# Patient Record
Sex: FEMALE | Race: WHITE | Employment: UNEMPLOYED | RURAL
[De-identification: names, ages, dates, MRNs, and addresses within clinical notes are randomized per-mention and may not be internally consistent; named-entity substitution may affect disease eponyms.]

---

## 2021-05-10 ENCOUNTER — HOSPITAL ENCOUNTER (EMERGENCY)
Age: 36
Discharge: HOME | End: 2021-05-10
Attending: FAMILY MEDICINE
Payer: OTHER GOVERNMENT

## 2021-05-10 ENCOUNTER — APPOINTMENT (OUTPATIENT)
Dept: CT IMAGING | Age: 36
End: 2021-05-10
Attending: FAMILY MEDICINE
Payer: OTHER GOVERNMENT

## 2021-05-10 VITALS
DIASTOLIC BLOOD PRESSURE: 72 MMHG | SYSTOLIC BLOOD PRESSURE: 134 MMHG | HEIGHT: 66 IN | WEIGHT: 231 LBS | BODY MASS INDEX: 37.12 KG/M2 | TEMPERATURE: 99.2 F | HEART RATE: 117 BPM | RESPIRATION RATE: 18 BRPM | OXYGEN SATURATION: 100 %

## 2021-05-10 DIAGNOSIS — J18.9 COMMUNITY ACQUIRED PNEUMONIA, UNSPECIFIED LATERALITY: Primary | ICD-10-CM

## 2021-05-10 LAB
ALBUMIN SERPL-MCNC: 3.7 G/DL (ref 3.5–5)
ALBUMIN/GLOB SERPL: 1 {RATIO} (ref 1.1–2.2)
ALP SERPL-CCNC: 93 U/L (ref 45–117)
ALT SERPL-CCNC: 184 U/L (ref 12–78)
ANION GAP SERPL CALC-SCNC: 15 MMOL/L (ref 5–15)
APPEARANCE UR: CLEAR
AST SERPL-CCNC: 109 U/L (ref 15–37)
BASOPHILS # BLD: 0 K/UL (ref 0–0.1)
BASOPHILS NFR BLD: 0 % (ref 0–1)
BILIRUB SERPL-MCNC: 0.3 MG/DL (ref 0.2–1)
BILIRUB UR QL CFM: NEGATIVE
BUN SERPL-MCNC: 11 MG/DL (ref 6–20)
BUN/CREAT SERPL: 13 (ref 12–20)
CALCIUM SERPL-MCNC: 8.3 MG/DL (ref 8.5–10.1)
CHLORIDE SERPL-SCNC: 103 MMOL/L (ref 97–108)
CO2 SERPL-SCNC: 22 MMOL/L (ref 21–32)
COLOR UR: ABNORMAL
CREAT SERPL-MCNC: 0.88 MG/DL (ref 0.55–1.02)
DIFFERENTIAL METHOD BLD: ABNORMAL
EOSINOPHIL # BLD: 0 K/UL (ref 0–0.4)
EOSINOPHIL NFR BLD: 0 % (ref 0–7)
ERYTHROCYTE [DISTWIDTH] IN BLOOD BY AUTOMATED COUNT: 14.1 % (ref 11.5–14.5)
FLUAV RNA SPEC QL NAA+PROBE: NOT DETECTED
FLUBV RNA SPEC QL NAA+PROBE: NOT DETECTED
GLOBULIN SER CALC-MCNC: 3.6 G/DL (ref 2–4)
GLUCOSE SERPL-MCNC: 98 MG/DL (ref 65–100)
GLUCOSE UR STRIP.AUTO-MCNC: NEGATIVE MG/DL
HCG UR QL: NEGATIVE
HCT VFR BLD AUTO: 36.7 % (ref 35–47)
HGB BLD-MCNC: 12.4 G/DL (ref 11.5–16)
HGB UR QL STRIP: NEGATIVE
IMM GRANULOCYTES # BLD AUTO: 0 K/UL (ref 0–0.04)
IMM GRANULOCYTES NFR BLD AUTO: 0 % (ref 0–0.5)
KETONES UR QL STRIP.AUTO: >80 MG/DL
LACTATE SERPL-SCNC: 1.1 MMOL/L (ref 0.4–2)
LEUKOCYTE ESTERASE UR QL STRIP.AUTO: NEGATIVE
LYMPHOCYTES # BLD: 1.4 K/UL (ref 0.8–3.5)
LYMPHOCYTES NFR BLD: 30 % (ref 12–49)
MCH RBC QN AUTO: 27 PG (ref 26–34)
MCHC RBC AUTO-ENTMCNC: 33.8 G/DL (ref 30–36.5)
MCV RBC AUTO: 79.8 FL (ref 80–99)
MONOCYTES # BLD: 0.4 K/UL (ref 0–1)
MONOCYTES NFR BLD: 8 % (ref 5–13)
NEUTS SEG # BLD: 2.9 K/UL (ref 1.8–8)
NEUTS SEG NFR BLD: 62 % (ref 32–75)
NITRITE UR QL STRIP.AUTO: NEGATIVE
NRBC # BLD: 0 K/UL (ref 0–0.01)
NRBC BLD-RTO: 0 PER 100 WBC
PH UR STRIP: 6 [PH] (ref 5–8)
PLATELET # BLD AUTO: 112 K/UL (ref 150–400)
PMV BLD AUTO: 9.1 FL (ref 8.9–12.9)
POTASSIUM SERPL-SCNC: 3.5 MMOL/L (ref 3.5–5.1)
PROT SERPL-MCNC: 7.3 G/DL (ref 6.4–8.2)
PROT UR STRIP-MCNC: NEGATIVE MG/DL
RBC # BLD AUTO: 4.6 M/UL (ref 3.8–5.2)
SARS-COV-2, COV2: NOT DETECTED
SODIUM SERPL-SCNC: 140 MMOL/L (ref 136–145)
SP GR UR REFRACTOMETRY: 1.02 (ref 1–1.03)
TROPONIN I SERPL-MCNC: <0.05 NG/ML
UROBILINOGEN UR QL STRIP.AUTO: 0.2 EU/DL (ref 0.2–1)
WBC # BLD AUTO: 4.6 K/UL (ref 3.6–11)

## 2021-05-10 PROCEDURE — 94640 AIRWAY INHALATION TREATMENT: CPT

## 2021-05-10 PROCEDURE — 36415 COLL VENOUS BLD VENIPUNCTURE: CPT

## 2021-05-10 PROCEDURE — 96365 THER/PROPH/DIAG IV INF INIT: CPT

## 2021-05-10 PROCEDURE — 74011000250 HC RX REV CODE- 250: Performed by: FAMILY MEDICINE

## 2021-05-10 PROCEDURE — 93005 ELECTROCARDIOGRAM TRACING: CPT

## 2021-05-10 PROCEDURE — 94664 DEMO&/EVAL PT USE INHALER: CPT

## 2021-05-10 PROCEDURE — 96367 TX/PROPH/DG ADDL SEQ IV INF: CPT

## 2021-05-10 PROCEDURE — 77030027138 HC INCENT SPIROMETER -A

## 2021-05-10 PROCEDURE — 71275 CT ANGIOGRAPHY CHEST: CPT

## 2021-05-10 PROCEDURE — 81003 URINALYSIS AUTO W/O SCOPE: CPT

## 2021-05-10 PROCEDURE — 83605 ASSAY OF LACTIC ACID: CPT

## 2021-05-10 PROCEDURE — 85025 COMPLETE CBC W/AUTO DIFF WBC: CPT

## 2021-05-10 PROCEDURE — 81025 URINE PREGNANCY TEST: CPT

## 2021-05-10 PROCEDURE — 74011250636 HC RX REV CODE- 250/636: Performed by: FAMILY MEDICINE

## 2021-05-10 PROCEDURE — 80053 COMPREHEN METABOLIC PANEL: CPT

## 2021-05-10 PROCEDURE — 84484 ASSAY OF TROPONIN QUANT: CPT

## 2021-05-10 PROCEDURE — 74011000258 HC RX REV CODE- 258: Performed by: FAMILY MEDICINE

## 2021-05-10 PROCEDURE — 74011000636 HC RX REV CODE- 636: Performed by: FAMILY MEDICINE

## 2021-05-10 PROCEDURE — 99284 EMERGENCY DEPT VISIT MOD MDM: CPT

## 2021-05-10 PROCEDURE — 87636 SARSCOV2 & INF A&B AMP PRB: CPT

## 2021-05-10 PROCEDURE — 87040 BLOOD CULTURE FOR BACTERIA: CPT

## 2021-05-10 PROCEDURE — 77030029684 HC NEB SM VOL KT MONA -A

## 2021-05-10 RX ORDER — ALBUTEROL SULFATE 90 UG/1
2 AEROSOL, METERED RESPIRATORY (INHALATION)
Qty: 1 INHALER | Refills: 0 | Status: SHIPPED | OUTPATIENT
Start: 2021-05-10

## 2021-05-10 RX ORDER — SODIUM CHLORIDE 0.9 % (FLUSH) 0.9 %
5-10 SYRINGE (ML) INJECTION ONCE
Status: DISCONTINUED | OUTPATIENT
Start: 2021-05-10 | End: 2021-05-10 | Stop reason: HOSPADM

## 2021-05-10 RX ORDER — CEFDINIR 300 MG/1
300 CAPSULE ORAL 2 TIMES DAILY
Qty: 14 CAP | Refills: 0 | Status: SHIPPED | OUTPATIENT
Start: 2021-05-10

## 2021-05-10 RX ORDER — INHALER, ASSIST DEVICES
1 SPACER (EA) MISCELLANEOUS AS NEEDED
Qty: 1 DEVICE | Refills: 0 | Status: SHIPPED | OUTPATIENT
Start: 2021-05-10

## 2021-05-10 RX ORDER — IPRATROPIUM BROMIDE AND ALBUTEROL SULFATE 2.5; .5 MG/3ML; MG/3ML
3 SOLUTION RESPIRATORY (INHALATION)
Status: COMPLETED | OUTPATIENT
Start: 2021-05-10 | End: 2021-05-10

## 2021-05-10 RX ADMIN — CEFTRIAXONE SODIUM 2 G: 2 INJECTION, POWDER, FOR SOLUTION INTRAMUSCULAR; INTRAVENOUS at 03:33

## 2021-05-10 RX ADMIN — IPRATROPIUM BROMIDE AND ALBUTEROL SULFATE 3 ML: .5; 3 SOLUTION RESPIRATORY (INHALATION) at 04:10

## 2021-05-10 RX ADMIN — SODIUM CHLORIDE 1000 ML: 9 INJECTION, SOLUTION INTRAVENOUS at 03:33

## 2021-05-10 RX ADMIN — AZITHROMYCIN MONOHYDRATE 500 MG: 500 INJECTION, POWDER, LYOPHILIZED, FOR SOLUTION INTRAVENOUS at 04:15

## 2021-05-10 RX ADMIN — IOPAMIDOL 100 ML: 755 INJECTION, SOLUTION INTRAVENOUS at 02:59

## 2021-05-10 NOTE — DISCHARGE INSTRUCTIONS
Return if worse or for change in symptoms, difficulty breathing, blood in sputum, problems as noted above. Continue Levaquin. We added on Omnicef and albuterol metered-dose inhaler. You received IV Rocephin and Zithromax and a DuoNeb nebulizer treatment here in the ED. Metered-dose inhaler should be used with a spacer device and should be started later today.

## 2021-05-10 NOTE — ED TRIAGE NOTES
Patient diagnosed with pneumonia on Friday, patient states she is having shortness of breath. Patient had two covid test done this week both negative.

## 2021-05-10 NOTE — ED PROVIDER NOTES
Patient is a 26-year-old female with a history of anemia and anxiety who presents with shortness of breath. She states her symptoms started 5 days prior to arrival.  She has had fever with sweats at home, T-max to 102. She has had a nonproductive cough. She has pleuritic chest pain left greater than right and chest heaviness when she takes a deep breath. She was seen at an urgent care facility 2 days ago, has a chest x-ray which showed pneumonia in the right and left and was started on Levaquin. She has been taking the antibiotics. She notes that she seems to be getting worse with increasing shortness of breath with exertion and now with just doing her hair. She does not smoke. She has no history of asthma. No calf pain swelling or pain. She has missed a menstral period but she states that she had a negative pregnancy test 2 days ago. She also states that she had a negative Covid test 2 days ago at the urgent care facility. No other history of pneumonia. She does not work at a healthcare facility. Patient states that she had been complaining of sinus pain last week and was taking Zithromax up until approximately 3 days ago. Patient states she was offered steroids but declined this as they make her anxiety disorder worse.            Past Medical History:   Diagnosis Date    Anemia NEC     Anxiety 6/22/2011    Arthritis     Depression     Depression 6/22/2011    GERD (gastroesophageal reflux disease)     Headache(784.0)        Past Surgical History:   Procedure Laterality Date    HX ABDOMINAL LAPAROSCOPY           Family History:   Problem Relation Age of Onset    Depression Mother     Substance Abuse Mother     Depression Father     Substance Abuse Father     Cancer Father     Depression Sister     Migraines Sister     Cancer Maternal Grandfather     Diabetes Paternal Grandmother     Stroke Paternal Grandmother     Heart Disease Paternal Grandmother        Social History Socioeconomic History    Marital status:      Spouse name: Not on file    Number of children: Not on file    Years of education: Not on file    Highest education level: Not on file   Occupational History    Not on file   Social Needs    Financial resource strain: Not on file    Food insecurity     Worry: Not on file     Inability: Not on file    Transportation needs     Medical: Not on file     Non-medical: Not on file   Tobacco Use    Smoking status: Never Smoker    Smokeless tobacco: Never Used   Substance and Sexual Activity    Alcohol use: No    Drug use: No    Sexual activity: Yes     Partners: Male   Lifestyle    Physical activity     Days per week: Not on file     Minutes per session: Not on file    Stress: Not on file   Relationships    Social connections     Talks on phone: Not on file     Gets together: Not on file     Attends Sabianism service: Not on file     Active member of club or organization: Not on file     Attends meetings of clubs or organizations: Not on file     Relationship status: Not on file    Intimate partner violence     Fear of current or ex partner: Not on file     Emotionally abused: Not on file     Physically abused: Not on file     Forced sexual activity: Not on file   Other Topics Concern    Not on file   Social History Narrative    Not on file         ALLERGIES: Trazodone    Review of Systems   All other systems reviewed and are negative. Vitals:    05/10/21 0040 05/10/21 0340 05/10/21 0418 05/10/21 0527   BP: (!) 161/72 116/73  134/72   Pulse: (!) 104 91  (!) 117   Resp: 18 16  18   Temp: 99.2 °F (37.3 °C)      SpO2: 95% 98% 99% 100%   Weight: 104.8 kg (231 lb)      Height: 5' 6\" (1.676 m)               Physical Exam  Vitals signs and nursing note reviewed. Constitutional:       General: She is not in acute distress. Appearance: Normal appearance. She is obese. She is not ill-appearing or toxic-appearing.       Comments: Anxious appearing HENT:      Head: Normocephalic and atraumatic. Right Ear: External ear normal.      Left Ear: External ear normal.      Nose: Nose normal.      Mouth/Throat:      Mouth: Mucous membranes are moist.      Pharynx: No oropharyngeal exudate or posterior oropharyngeal erythema. Eyes:      Extraocular Movements: Extraocular movements intact. Conjunctiva/sclera: Conjunctivae normal.      Pupils: Pupils are equal, round, and reactive to light. Neck:      Musculoskeletal: Normal range of motion and neck supple. No muscular tenderness. Vascular: No JVD. Trachea: No tracheal deviation. Cardiovascular:      Rate and Rhythm: Regular rhythm. Tachycardia present. Heart sounds: Normal heart sounds. No murmur. No friction rub. No gallop. Pulmonary:      Effort: Pulmonary effort is normal. No tachypnea, accessory muscle usage or respiratory distress. Breath sounds: Normal breath sounds. No stridor. No decreased breath sounds, wheezing, rhonchi or rales. Comments: O2 sats 98% on room air  Chest:      Chest wall: No tenderness or edema. Abdominal:      General: There is no distension. Palpations: Abdomen is soft. Tenderness: There is no abdominal tenderness. There is no right CVA tenderness, left CVA tenderness, guarding or rebound. Musculoskeletal: Normal range of motion. General: No swelling, tenderness, deformity or signs of injury. Right lower leg: She exhibits no tenderness. No edema. Left lower leg: She exhibits no tenderness. No edema. Lymphadenopathy:      Cervical: No cervical adenopathy. Skin:     General: Skin is warm and dry. Capillary Refill: Capillary refill takes less than 2 seconds. Coloration: Skin is not jaundiced or pale. Findings: No rash. Neurological:      General: No focal deficit present. Mental Status: She is alert and oriented to person, place, and time. Cranial Nerves: No cranial nerve deficit. Sensory: No sensory deficit. Motor: No weakness. Coordination: Coordination normal.      Gait: Gait normal.   Psychiatric:         Mood and Affect: Mood normal.         Behavior: Behavior normal.         Thought Content:  Thought content normal.         Judgment: Judgment normal.        Labs -     Recent Results (from the past 12 hour(s))   EKG, 12 LEAD, INITIAL    Collection Time: 05/10/21  1:27 AM   Result Value Ref Range    Ventricular Rate 91 BPM    Atrial Rate 91 BPM    P-R Interval 132 ms    QRS Duration 66 ms    Q-T Interval 344 ms    QTC Calculation (Bezet) 423 ms    Calculated P Axis 29 degrees    Calculated R Axis 67 degrees    Calculated T Axis 11 degrees    Diagnosis       Normal sinus rhythm  Normal ECG  No previous ECGs available     URINALYSIS W/ RFLX MICROSCOPIC    Collection Time: 05/10/21  1:40 AM   Result Value Ref Range    Color YELLOW/STRAW      Appearance CLEAR CLEAR      Specific gravity 1.020 1.003 - 1.030      pH (UA) 6.0 5.0 - 8.0      Protein Negative NEG mg/dL    Glucose Negative NEG mg/dL    Ketone >80 (A) NEG mg/dL    Blood Negative NEG      Urobilinogen 0.2 0.2 - 1.0 EU/dL    Nitrites Negative NEG      Leukocyte Esterase Negative NEG     HCG URINE, QL    Collection Time: 05/10/21  1:40 AM   Result Value Ref Range    HCG urine, QL Negative NEG     BILIRUBIN, CONFIRM    Collection Time: 05/10/21  1:40 AM   Result Value Ref Range    Bilirubin UA, confirm Negative NEG     COVID-19 WITH INFLUENZA A/B    Collection Time: 05/10/21  1:45 AM   Result Value Ref Range    SARS-CoV-2 Not detected NOTD      Influenza A by PCR Not detected NOTD      Influenza B by PCR Not detected NOTD     CBC WITH AUTOMATED DIFF    Collection Time: 05/10/21  1:50 AM   Result Value Ref Range    WBC 4.6 3.6 - 11.0 K/uL    RBC 4.60 3.80 - 5.20 M/uL    HGB 12.4 11.5 - 16.0 g/dL    HCT 36.7 35.0 - 47.0 %    MCV 79.8 (L) 80.0 - 99.0 FL    MCH 27.0 26.0 - 34.0 PG    MCHC 33.8 30.0 - 36.5 g/dL    RDW 14.1 11.5 - 14.5 %    PLATELET 327 (L) 718 - 400 K/uL    MPV 9.1 8.9 - 12.9 FL    NRBC 0.0 0  WBC    ABSOLUTE NRBC 0.00 0.00 - 0.01 K/uL    NEUTROPHILS 62 32 - 75 %    LYMPHOCYTES 30 12 - 49 %    MONOCYTES 8 5 - 13 %    EOSINOPHILS 0 0 - 7 %    BASOPHILS 0 0 - 1 %    IMMATURE GRANULOCYTES 0 0.0 - 0.5 %    ABS. NEUTROPHILS 2.9 1.8 - 8.0 K/UL    ABS. LYMPHOCYTES 1.4 0.8 - 3.5 K/UL    ABS. MONOCYTES 0.4 0.0 - 1.0 K/UL    ABS. EOSINOPHILS 0.0 0.0 - 0.4 K/UL    ABS. BASOPHILS 0.0 0.0 - 0.1 K/UL    ABS. IMM. GRANS. 0.0 0.00 - 0.04 K/UL    DF AUTOMATED     METABOLIC PANEL, COMPREHENSIVE    Collection Time: 05/10/21  1:50 AM   Result Value Ref Range    Sodium 140 136 - 145 mmol/L    Potassium 3.5 3.5 - 5.1 mmol/L    Chloride 103 97 - 108 mmol/L    CO2 22 21 - 32 mmol/L    Anion gap 15 5 - 15 mmol/L    Glucose 98 65 - 100 mg/dL    BUN 11 6 - 20 MG/DL    Creatinine 0.88 0.55 - 1.02 MG/DL    BUN/Creatinine ratio 13 12 - 20      GFR est AA >60 >60 ml/min/1.73m2    GFR est non-AA >60 >60 ml/min/1.73m2    Calcium 8.3 (L) 8.5 - 10.1 MG/DL    Bilirubin, total 0.3 0.2 - 1.0 MG/DL    ALT (SGPT) 184 (H) 12 - 78 U/L    AST (SGOT) 109 (H) 15 - 37 U/L    Alk.  phosphatase 93 45 - 117 U/L    Protein, total 7.3 6.4 - 8.2 g/dL    Albumin 3.7 3.5 - 5.0 g/dL    Globulin 3.6 2.0 - 4.0 g/dL    A-G Ratio 1.0 (L) 1.1 - 2.2     CULTURE, BLOOD    Collection Time: 05/10/21  1:50 AM    Specimen: Blood   Result Value Ref Range    Special Requests: NO SPECIAL REQUESTS      Culture result: NO GROWTH AFTER 4 HOURS     TROPONIN I    Collection Time: 05/10/21  1:50 AM   Result Value Ref Range    Troponin-I, Qt. <0.05 <0.05 ng/mL   LACTIC ACID    Collection Time: 05/10/21  1:55 AM   Result Value Ref Range    Lactic acid 1.1 0.4 - 2.0 MMOL/L   CULTURE, BLOOD    Collection Time: 05/10/21  1:55 AM    Specimen: Blood   Result Value Ref Range    Special Requests: NO SPECIAL REQUESTS      Culture result: NO GROWTH AFTER 4 HOURS         Radiologic Studies -   CT Results (Last 48 hours)               05/10/21 0259  CTA CHEST W OR W WO CONT Final result    Impression:      1. No acute pulmonary embolus. 2. Patchy bilateral airspace disease concerning for viral/atypical pneumonia. Narrative:  INDICATION:  pleuritic chest pain       EXAM:  CTA Chest with contrast for Pulmonary Embolus       COMPARISON:  None       TECHNIQUE:  Following the uneventful intravenous administration of IV contrast   thin helical axial images were obtained through the chest. 3D image   postprocessing was performed. CT dose reduction was achieved through use of a   standardized protocol tailored for this examination and automatic exposure   control for dose modulation. FINDINGS:       THYROID: Unremarkable. MEDIASTINUM/JOANIE: Small calcified subcarinal lymph nodes. HEART/PERICARDIUM: Unremarkable. AORTA:  No aneurysm. PULMONARY ARTERIES:No pulmonary embolism. LUNGS/PLEURA: Patchy areas of groundglass opacity throughout the bilateral upper   lobes and right lower lobe. No pleural effusion or pneumothorax. INCIDENTALLY IMAGED UPPER ABDOMEN: No significant abnormality. BONES: No destructive bone lesion.    ADDITIONAL COMMENTS:  N/A               CXR Results  (Last 48 hours)    None          XR Results (most recent):        MDM  Number of Diagnoses or Management Options  Community acquired pneumonia, unspecified laterality: new and requires workup     Amount and/or Complexity of Data Reviewed  Clinical lab tests: reviewed and ordered  Tests in the radiology section of CPT®: reviewed and ordered  Tests in the medicine section of CPT®: reviewed and ordered  Review and summarize past medical records: yes  Discuss the patient with other providers: yes (Dr. Priscilla Cedillo)    Risk of Complications, Morbidity, and/or Mortality  Presenting problems: high  Diagnostic procedures: high  Management options: moderate  General comments: Differential includes congestive heart failure, pneumothorax, pleural effusion, atypical pneumonia versus viral versus bacterial pneumonia, Covid, bronchitis, COPD exacerbation, reversal obstructive airways disease    Patient Progress  Patient progress: improved    ED Course as of May 10 0629   Mon May 10, 2021   0147 EKG demonstrates normal sinus rhythm with a normal CT interval of 91, normal CT of 132, normal QRS of 66, normal QTC of 423, interpretation is normal ECG, no evidence of ischemia or infarction. [PS]   7724 CT of chest with contrast was performed as patient has pleuritic chest pain, low-grade fever, tachycardia and no appreciable rales on exam.  WBC is unremarkable, lactic is negative troponin is negative Covid and influenza tests are negative, hCG is negative, CMP remarkable for elevated ALT and AST with normal bilirubin and alk phos, otherwise unremarkable. Analysis demonstrates ketonuria, otherwise unremarkable. On may 7 2021 pt had chest x ray demonstrating Patchy opacities within the left mid lung and right lung base, suggesting multifocal pneumonia    [PS]   0319 PULMONARY ARTERIES:No pulmonary embolism. LUNGS/PLEURA: Patchy areas of groundglass opacity throughout the bilateral upper  lobes and right lower lobe. No pleural effusion or pneumothorax. IMPRESSION 1. No acute pulmonary embolus. 2. Patchy bilateral airspace disease concerning for viral/atypical pneumonia.       [PS]   2700 Was ambulated in the ED on room air approximately 200feet oxygen saturations did not drop below 96%, heart rate did increase to 120. She felt mildly short of breath. Heart rate returned to less than 100 at rest.    [PS]   [facility] discussed with . Care discussed with patient in detail. Admission to hospital offered, patient will discuss care with her . We will try DuoNeb to treat her subjective dyspnea. [PS]   [id number] Patient states that she feels a lot better after the DuoNeb treatment.   She has mild tachycardia of 120 which is consistent with albuterol inhalation. She would like to go home. She will follow up with her family doctor in 1 day. She is to return if worse in any way. We will continue albuterol with a spacer device at home and continue Levaquin and add on Omnicef. [PS]      ED Course User Index  [PS] Christo Sanderson MD       Procedures        Orders Placed This Encounter    CULTURE, BLOOD    CULTURE, BLOOD    CTA CHEST W OR W WO CONT    CBC WITH AUTOMATED DIFF    CMP    LACTIC ACID, PLASMA    URINALYSIS W/ RFLX MICROSCOPIC    LAB PREGNANCY TEST    TROPONIN I    COVID-19 WITH INFLUENZA A/B    BILIRUBIN, CONFIRM    EKG, 12 LEAD, INITIAL    SALINE LOCK IV ONE TIME STAT    sodium chloride (NS) flush 5-10 mL    iopamidoL (ISOVUE-370) 76 % injection 100 mL    cefTRIAXone (ROCEPHIN) 2 g in 0.9% sodium chloride (MBP/ADV) 50 mL MBP    azithromycin (ZITHROMAX) 500 mg in 0.9% sodium chloride 250 mL (VIAL-MATE)    sodium chloride 0.9 % bolus infusion 1,000 mL    albuterol-ipratropium (DUO-NEB) 2.5 MG-0.5 MG/3 ML    cefdinir (OMNICEF) 300 mg capsule    albuterol (Proventil HFA) 90 mcg/actuation inhaler    inhalational spacing device (Aerochamber MV)         MEDICATIONS GIVEN:    Current Facility-Administered Medications   Medication Dose Route Frequency    sodium chloride (NS) flush 5-10 mL  5-10 mL IntraVENous ONCE     Current Outpatient Medications   Medication Sig    cefdinir (OMNICEF) 300 mg capsule Take 1 Cap by mouth two (2) times a day.  albuterol (Proventil HFA) 90 mcg/actuation inhaler Take 2 Puffs by inhalation every four (4) hours as needed for Wheezing.  inhalational spacing device (Aerochamber MV) 1 Each by Does Not Apply route as needed for Wheezing.  buPROPion XL (WELLBUTRIN XL) 150 mg tablet Take 1 Tab by mouth every morning.  PNV Ca#37-Iron Cb,As,Gl-FA-OM3 27-1-330 mg Cap Take 1 Tab by mouth daily.        Patient Vitals for the past 8 hrs:   Temp Pulse Resp BP SpO2   05/10/21 0527  (!) 117 18 134/72 100 %   05/10/21 0418     99 %   05/10/21 0340  91 16 116/73 98 %   05/10/21 0040 99.2 °F (37.3 °C) (!) 104 18 (!) 161/72 95 %       Medications   sodium chloride (NS) flush 5-10 mL (has no administration in time range)   iopamidoL (ISOVUE-370) 76 % injection 100 mL (100 mL IntraVENous Given 5/10/21 0259)   cefTRIAXone (ROCEPHIN) 2 g in 0.9% sodium chloride (MBP/ADV) 50 mL MBP (0 g IntraVENous IV Completed 5/10/21 0405)   azithromycin (ZITHROMAX) 500 mg in 0.9% sodium chloride 250 mL (VIAL-MATE) (0 mg IntraVENous IV Completed 5/10/21 0520)   sodium chloride 0.9 % bolus infusion 1,000 mL (0 mL IntraVENous IV Completed 5/10/21 0430)   albuterol-ipratropium (DUO-NEB) 2.5 MG-0.5 MG/3 ML (3 mL Nebulization Given 5/10/21 0410)       Discharge note:    I have reviewed all pertinent and currently available diagnostic test results for this visit including, but not limited to, labs, xrays, and EKGs. I have reviewed all pertinent and currently available medical records. My plan of care and further evaluation and/or disposition is based on these results, as well as the initial, and subsequent, history and physical exam, as well as any additional complaints during the visit. Pt has been re-examined and states that they are feeling better and have no new complaints. Patient has nontoxic appearance and condition is stable for discharge. Laboratory tests, medications, x-rays, diagnosis, follow up plan and return instructions have been reviewed and discussed with the patient and/or family. Pt and/or family were instructed on symptoms that may arise after discharge requiring re-evaluation by a physician. Pt and/or family have had the opportunity to ask questions about their care. Patient and/or family verbalized understanding and agreement with care plan, follow up and return instructions.  Patient and/or family agree to return in 50 hours if their symptoms are not improving or immediately if they have any change in their condition. I have also put together some discharge instructions for the patient that include: 1) educational information regarding their diagnosis, 2) how to care for their diagnosis at home, as well a 3) list of reasons why they would want to return to the ED prior to their follow-up appointment, should their condition change as well as instructions to return to the ED should sx worsen at any time. Vital signs stable for discharge. Carl Carpenter MD      Disposition     Clinical Impression:     ICD-10-CM ICD-9-CM    1. Community acquired pneumonia, unspecified laterality  J18.9 5          PLAN:  1. Discharge improved in stable condition  1. Discharge Medication List as of 5/10/2021  5:19 AM      START taking these medications    Details   cefdinir (OMNICEF) 300 mg capsule Take 1 Cap by mouth two (2) times a day., Normal, Disp-14 Cap, R-0      albuterol (Proventil HFA) 90 mcg/actuation inhaler Take 2 Puffs by inhalation every four (4) hours as needed for Wheezing., Normal, Disp-1 Inhaler, R-0      inhalational spacing device (Aerochamber MV) 1 Each by Does Not Apply route as needed for Wheezing., Normal, Disp-1 Device, R-0         CONTINUE these medications which have NOT CHANGED    Details   buPROPion XL (WELLBUTRIN XL) 150 mg tablet Take 1 Tab by mouth every morning., Normal, Disp-30 Tab, R-0      PNV Ca#37-Iron Cb,As,Gl-FA-OM3 27-1-330 mg Cap Take 1 Tab by mouth daily. Print, 1 Tab, Disp-100 Tab, R-5           2. Follow-up Information     Follow up With Specialties Details Why Contact Koffi Cormier MD Family Medicine Today Follow up todays symptoms. [address] [facility]  [phone]            3. Discharged  Return to ED if worse    Please note, this dictation was completed with [employer], the [email or web address] voice recognition software.  Quite often unanticipated grammatical, syntax, homophones, and other interpretive errors are inadvertently transcribed by the computer software. Please disregard these errors. Please excuse any errors that have escaped final proof reading.

## 2021-05-16 LAB
BACTERIA SPEC CULT: NORMAL
BACTERIA SPEC CULT: NORMAL
SERVICE CMNT-IMP: NORMAL
SERVICE CMNT-IMP: NORMAL

## 2021-07-29 LAB
ATRIAL RATE: 91 BPM
CALCULATED P AXIS, ECG09: 29 DEGREES
CALCULATED R AXIS, ECG10: 67 DEGREES
CALCULATED T AXIS, ECG11: 11 DEGREES
DIAGNOSIS, 93000: NORMAL
P-R INTERVAL, ECG05: 132 MS
Q-T INTERVAL, ECG07: 344 MS
QRS DURATION, ECG06: 66 MS
QTC CALCULATION (BEZET), ECG08: 423 MS
VENTRICULAR RATE, ECG03: 91 BPM